# Patient Record
Sex: FEMALE | Race: OTHER | Employment: OTHER | URBAN - METROPOLITAN AREA
[De-identification: names, ages, dates, MRNs, and addresses within clinical notes are randomized per-mention and may not be internally consistent; named-entity substitution may affect disease eponyms.]

---

## 2017-06-08 ENCOUNTER — OFFICE VISIT (OUTPATIENT)
Dept: INTERNAL MEDICINE CLINIC | Facility: CLINIC | Age: 47
End: 2017-06-08

## 2017-06-08 VITALS
HEIGHT: 60 IN | WEIGHT: 123 LBS | DIASTOLIC BLOOD PRESSURE: 70 MMHG | SYSTOLIC BLOOD PRESSURE: 94 MMHG | HEART RATE: 72 BPM | BODY MASS INDEX: 24.15 KG/M2 | TEMPERATURE: 99 F

## 2017-06-08 DIAGNOSIS — Z00.00 ANNUAL PHYSICAL EXAM: Primary | ICD-10-CM

## 2017-06-08 PROCEDURE — 99396 PREV VISIT EST AGE 40-64: CPT | Performed by: INTERNAL MEDICINE

## 2017-06-08 NOTE — PROGRESS NOTES
HPI:    Patient ID: Glory Orozco is a 51year old female. HPIpatient is doing well, no complaints, physical today for . Review of Systems   Constitutional: Negative for fever, chills, activity change, appetite change and fatigue.    HENT: no distension and no mass. There is no tenderness. There is no rebound and no guarding. Musculoskeletal: She exhibits no edema or tenderness. Neurological: She is alert and oriented to person, place, and time. She exhibits normal muscle tone.  Coordinat

## 2017-11-14 ENCOUNTER — TELEPHONE (OUTPATIENT)
Dept: INTERNAL MEDICINE CLINIC | Facility: CLINIC | Age: 47
End: 2017-11-14

## 2017-11-14 DIAGNOSIS — Z12.39 SCREENING BREAST EXAMINATION: Primary | ICD-10-CM

## 2017-12-02 ENCOUNTER — HOSPITAL ENCOUNTER (OUTPATIENT)
Dept: MAMMOGRAPHY | Age: 47
Discharge: HOME | End: 2017-12-02
Attending: INTERNAL MEDICINE
Payer: COMMERCIAL

## 2017-12-02 DIAGNOSIS — Z12.39 SCREENING BREAST EXAMINATION: ICD-10-CM

## 2017-12-02 PROCEDURE — 77067 SCR MAMMO BI INCL CAD: CPT | Performed by: INTERNAL MEDICINE

## 2018-08-23 ENCOUNTER — OFFICE VISIT (OUTPATIENT)
Dept: INTERNAL MEDICINE CLINIC | Facility: CLINIC | Age: 48
End: 2018-08-23
Payer: COMMERCIAL

## 2018-08-23 VITALS
WEIGHT: 126 LBS | BODY MASS INDEX: 24.74 KG/M2 | SYSTOLIC BLOOD PRESSURE: 100 MMHG | TEMPERATURE: 99 F | HEIGHT: 60 IN | DIASTOLIC BLOOD PRESSURE: 70 MMHG | HEART RATE: 64 BPM

## 2018-08-23 DIAGNOSIS — R76.11 POSITIVE TB TEST: ICD-10-CM

## 2018-08-23 DIAGNOSIS — Z12.39 BREAST SCREENING: ICD-10-CM

## 2018-08-23 DIAGNOSIS — Z00.00 ANNUAL PHYSICAL EXAM: Primary | ICD-10-CM

## 2018-08-23 PROCEDURE — 99396 PREV VISIT EST AGE 40-64: CPT | Performed by: INTERNAL MEDICINE

## 2018-08-23 NOTE — PROGRESS NOTES
HPI:    Patient ID: Sabrina Gonzalez is a 49year old female. HPI    Review of Systems   Constitutional: Negative for activity change, appetite change, chills, fatigue and fever.    HENT: Negative for ear pain, hearing loss, nosebleeds, postnasal drip, rhi She exhibits no distension and no mass. There is no tenderness. There is no rebound and no guarding. Musculoskeletal: She exhibits no edema or tenderness. Neurological: She is alert and oriented to person, place, and time.  She exhibits normal muscle to

## 2018-08-24 LAB
ABSOLUTE BASOPHILS: 52 CELLS/UL (ref 0–200)
ABSOLUTE EOSINOPHILS: 133 CELLS/UL (ref 15–500)
ABSOLUTE LYMPHOCYTES: 2059 CELLS/UL (ref 850–3900)
ABSOLUTE MONOCYTES: 464 CELLS/UL (ref 200–950)
ABSOLUTE NEUTROPHILS: 3091 CELLS/UL (ref 1500–7800)
ALBUMIN/GLOBULIN RATIO: 1.6 (CALC) (ref 1–2.5)
ALBUMIN: 4.6 G/DL (ref 3.6–5.1)
ALKALINE PHOSPHATASE: 52 U/L (ref 33–115)
ALT: 13 U/L (ref 6–29)
APPEARANCE: CLEAR
AST: 20 U/L (ref 10–35)
BASOPHILS: 0.9 %
BILIRUBIN, TOTAL: 0.3 MG/DL (ref 0.2–1.2)
BILIRUBIN: NEGATIVE
BUN: 23 MG/DL (ref 7–25)
CALCIUM: 9.7 MG/DL (ref 8.6–10.2)
CARBON DIOXIDE: 25 MMOL/L (ref 20–32)
CHLORIDE: 104 MMOL/L (ref 98–110)
CHOL/HDLC RATIO: 2 (CALC)
CHOLESTEROL, TOTAL: 159 MG/DL
COLOR: YELLOW
CREATININE: 0.65 MG/DL (ref 0.5–1.1)
EGFR IF AFRICN AM: 122 ML/MIN/1.73M2
EGFR IF NONAFRICN AM: 105 ML/MIN/1.73M2
EOSINOPHILS: 2.3 %
GLOBULIN: 2.8 G/DL (CALC) (ref 1.9–3.7)
GLUCOSE: 87 MG/DL (ref 65–99)
GLUCOSE: NEGATIVE
HDL CHOLESTEROL: 78 MG/DL
HEMATOCRIT: 34.9 % (ref 35–45)
HEMOGLOBIN A1C: 5 % OF TOTAL HGB
HEMOGLOBIN: 11.7 G/DL (ref 11.7–15.5)
KETONES: NEGATIVE
LDL-CHOLESTEROL: 69 MG/DL (CALC)
LEUKOCYTE ESTERASE: NEGATIVE
LYMPHOCYTES: 35.5 %
MCH: 29.5 PG (ref 27–33)
MCHC: 33.5 G/DL (ref 32–36)
MCV: 88.1 FL (ref 80–100)
MONOCYTES: 8 %
MPV: 11.4 FL (ref 7.5–12.5)
NEUTROPHILS: 53.3 %
NITRITE: NEGATIVE
NON-HDL CHOLESTEROL: 81 MG/DL (CALC)
PH: 6 (ref 5–8)
PLATELET COUNT: 238 THOUSAND/UL (ref 140–400)
POTASSIUM: 4.2 MMOL/L (ref 3.5–5.3)
PROTEIN, TOTAL: 7.4 G/DL (ref 6.1–8.1)
PROTEIN: NEGATIVE
RDW: 15.2 % (ref 11–15)
RED BLOOD CELL COUNT: 3.96 MILLION/UL (ref 3.8–5.1)
SIGNAL TO CUT-OFF: 0.02
SODIUM: 137 MMOL/L (ref 135–146)
SPECIFIC GRAVITY: 1.01 (ref 1–1.03)
TRIGLYCERIDES: 50 MG/DL
TSH: 1.82 MIU/L
VITAMIN D, 25-OH, TOTAL: 28 NG/ML (ref 30–100)
WHITE BLOOD CELL COUNT: 5.8 THOUSAND/UL (ref 3.8–10.8)

## 2019-02-04 ENCOUNTER — HOSPITAL ENCOUNTER (OUTPATIENT)
Dept: MAMMOGRAPHY | Facility: HOSPITAL | Age: 49
Discharge: HOME | End: 2019-02-04
Attending: INTERNAL MEDICINE
Payer: COMMERCIAL

## 2019-02-04 DIAGNOSIS — Z12.39 BREAST SCREENING: ICD-10-CM

## 2019-02-04 PROCEDURE — 77067 SCR MAMMO BI INCL CAD: CPT | Performed by: INTERNAL MEDICINE

## 2019-02-04 PROCEDURE — 77063 BREAST TOMOSYNTHESIS BI: CPT | Performed by: INTERNAL MEDICINE

## 2020-06-25 ENCOUNTER — NURSE ONLY (OUTPATIENT)
Dept: FAMILY MEDICINE CLINIC | Facility: CLINIC | Age: 50
End: 2020-06-25
Payer: COMMERCIAL

## 2020-06-25 ENCOUNTER — OFFICE VISIT (OUTPATIENT)
Dept: INTERNAL MEDICINE CLINIC | Facility: CLINIC | Age: 50
End: 2020-06-25
Payer: COMMERCIAL

## 2020-06-25 VITALS
WEIGHT: 127 LBS | HEIGHT: 60 IN | TEMPERATURE: 99 F | DIASTOLIC BLOOD PRESSURE: 66 MMHG | SYSTOLIC BLOOD PRESSURE: 120 MMHG | BODY MASS INDEX: 24.94 KG/M2 | HEART RATE: 68 BPM

## 2020-06-25 DIAGNOSIS — R76.11 POSITIVE TB TEST: ICD-10-CM

## 2020-06-25 DIAGNOSIS — Z00.00 ANNUAL PHYSICAL EXAM: Primary | ICD-10-CM

## 2020-06-25 DIAGNOSIS — Z12.39 BREAST SCREENING: ICD-10-CM

## 2020-06-25 DIAGNOSIS — Z12.11 COLON CANCER SCREENING: ICD-10-CM

## 2020-06-25 PROCEDURE — 99396 PREV VISIT EST AGE 40-64: CPT | Performed by: INTERNAL MEDICINE

## 2020-06-25 RX ORDER — ASCORBIC ACID 500 MG
500 TABLET ORAL DAILY
COMMUNITY

## 2020-06-25 RX ORDER — ACETAMINOPHEN 160 MG
2000 TABLET,DISINTEGRATING ORAL 3 TIMES DAILY
COMMUNITY

## 2020-06-25 RX ORDER — HYDROCORTISONE ACETATE 0.5 %
CREAM (GRAM) TOPICAL
COMMUNITY

## 2020-06-25 RX ORDER — ELECTROLYTES/DEXTROSE
SOLUTION, ORAL ORAL
COMMUNITY
End: 2022-02-01

## 2020-06-25 RX ORDER — MELATONIN
1000 DAILY
COMMUNITY

## 2020-06-26 NOTE — PROGRESS NOTES
HPI:    Patient ID: Lane Arauz is a 47year old female. HPIpatient is in need of her annual for work, needs TB screening, history of previous positive skin test.   No chest pain , no fever no chills, no cough .    Has right ankle tightness at times, Apply 1 Units topically twice a week.  120 mL 11     Allergies:  Seasonal                   PHYSICAL EXAM:   /66   Pulse 68   Temp 98.8 °F (37.1 °C)   Ht 5' (1.524 m)   Wt 127 lb (57.6 kg)   BMI 24.80 kg/m²      Physical Exam   Constitutional: She is

## 2020-07-02 ENCOUNTER — TELEPHONE (OUTPATIENT)
Dept: FAMILY MEDICINE CLINIC | Facility: CLINIC | Age: 50
End: 2020-07-02

## 2020-07-03 NOTE — TELEPHONE ENCOUNTER
Patient notified the form is completed and copies of chest xray received and attached. She is picking up today.

## 2021-04-17 ENCOUNTER — HOSPITAL ENCOUNTER (OUTPATIENT)
Dept: MAMMOGRAPHY | Age: 51
Discharge: HOME | End: 2021-04-17
Attending: INTERNAL MEDICINE
Payer: COMMERCIAL

## 2021-04-17 DIAGNOSIS — Z12.39 BREAST SCREENING: ICD-10-CM

## 2021-04-17 PROCEDURE — 77063 BREAST TOMOSYNTHESIS BI: CPT | Performed by: INTERNAL MEDICINE

## 2021-04-17 PROCEDURE — 77067 SCR MAMMO BI INCL CAD: CPT | Performed by: INTERNAL MEDICINE

## 2021-04-19 ENCOUNTER — TELEPHONE (OUTPATIENT)
Dept: INTERNAL MEDICINE CLINIC | Facility: CLINIC | Age: 51
End: 2021-04-19

## 2021-04-19 DIAGNOSIS — Z01.419 ENCOUNTER FOR ANNUAL ROUTINE GYNECOLOGICAL EXAMINATION: Primary | ICD-10-CM

## 2021-05-22 ENCOUNTER — OFFICE VISIT (OUTPATIENT)
Dept: OBGYN CLINIC | Facility: CLINIC | Age: 51
End: 2021-05-22
Payer: COMMERCIAL

## 2021-05-22 VITALS
BODY MASS INDEX: 25.72 KG/M2 | WEIGHT: 131 LBS | SYSTOLIC BLOOD PRESSURE: 108 MMHG | HEIGHT: 60 IN | DIASTOLIC BLOOD PRESSURE: 64 MMHG

## 2021-05-22 DIAGNOSIS — Z01.419 ENCOUNTER FOR WELL WOMAN EXAM: Primary | ICD-10-CM

## 2021-05-22 DIAGNOSIS — Z01.419 ENCOUNTER FOR GYNECOLOGICAL EXAMINATION WITHOUT ABNORMAL FINDING: ICD-10-CM

## 2021-05-22 PROCEDURE — 3074F SYST BP LT 130 MM HG: CPT | Performed by: OBSTETRICS & GYNECOLOGY

## 2021-05-22 PROCEDURE — 3008F BODY MASS INDEX DOCD: CPT | Performed by: OBSTETRICS & GYNECOLOGY

## 2021-05-22 PROCEDURE — 3078F DIAST BP <80 MM HG: CPT | Performed by: OBSTETRICS & GYNECOLOGY

## 2021-05-22 PROCEDURE — 99386 PREV VISIT NEW AGE 40-64: CPT | Performed by: OBSTETRICS & GYNECOLOGY

## 2021-05-22 NOTE — PROGRESS NOTES
HPI/Subjective:   Patient ID: Helga Hendrix is a 45year old female. Patient here for routine exam.  New to office. No C/Os. Needs pap smear today. Recently had normal mammogram that was ordered by PCP. Will do breast exam today.   Patient reports h adenopathy. Abdominal:      General: Bowel sounds are normal.      Palpations: Abdomen is soft. There is no mass. Tenderness: There is no abdominal tenderness. Genitourinary:     General: Normal vulva. Vagina: Normal. No vaginal discharge.

## 2021-05-27 ENCOUNTER — OFFICE VISIT (OUTPATIENT)
Dept: GASTROENTEROLOGY | Facility: CLINIC | Age: 51
End: 2021-05-27
Payer: COMMERCIAL

## 2021-05-27 ENCOUNTER — TELEPHONE (OUTPATIENT)
Dept: GASTROENTEROLOGY | Facility: CLINIC | Age: 51
End: 2021-05-27

## 2021-05-27 VITALS
HEART RATE: 72 BPM | DIASTOLIC BLOOD PRESSURE: 74 MMHG | WEIGHT: 131.38 LBS | BODY MASS INDEX: 25.79 KG/M2 | HEIGHT: 60 IN | SYSTOLIC BLOOD PRESSURE: 124 MMHG

## 2021-05-27 DIAGNOSIS — Z80.0 FAMILY HISTORY OF COLON CANCER: ICD-10-CM

## 2021-05-27 DIAGNOSIS — Z12.11 COLON CANCER SCREENING: Primary | ICD-10-CM

## 2021-05-27 PROCEDURE — 3008F BODY MASS INDEX DOCD: CPT | Performed by: INTERNAL MEDICINE

## 2021-05-27 PROCEDURE — 3074F SYST BP LT 130 MM HG: CPT | Performed by: INTERNAL MEDICINE

## 2021-05-27 PROCEDURE — 3078F DIAST BP <80 MM HG: CPT | Performed by: INTERNAL MEDICINE

## 2021-05-27 PROCEDURE — S0285 CNSLT BEFORE SCREEN COLONOSC: HCPCS | Performed by: INTERNAL MEDICINE

## 2021-05-27 NOTE — PROGRESS NOTES
Jeane Ferreira is a 45year old female. HPI:   Patient presents with:  Colonoscopy Screening    The patient is a 45year old female who has a history of tuberculosis, fractured wrist who presents for colon cancer screening.   She does have a family histo pulse 72, height 5' (1.524 m), weight 131 lb 6.4 oz (59.6 kg), last menstrual period 04/27/2021.     The patient appears their stated age and is in no acute distress  HEENT- anicteric sclera, neck no lymphadnopathy, OP- clear with no masses or lesions  Ches

## 2021-05-28 NOTE — TELEPHONE ENCOUNTER
Scheduled for:  Colonoscopy 84606  Provider Name:    Date:  8/6/21  Location:  [facility]  Sedation: Mac   Time:  10:30 Am , (pt is aware to arrive at 0930 Am )  Prep:  Miralax prep + Gatorade + Dulcolax tablets, Prep instructions were given to pt in the

## 2021-08-06 ENCOUNTER — HOSPITAL ENCOUNTER (OUTPATIENT)
Age: 51
Setting detail: HOSPITAL OUTPATIENT SURGERY
Discharge: HOME | End: 2021-08-06
Attending: INTERNAL MEDICINE | Admitting: INTERNAL MEDICINE
Payer: COMMERCIAL

## 2021-08-06 ENCOUNTER — TELEPHONE (OUTPATIENT)
Dept: GASTROENTEROLOGY | Facility: CLINIC | Age: 51
End: 2021-08-06

## 2021-08-06 ENCOUNTER — ANESTHESIA EVENT (OUTPATIENT)
Dept: ENDOSCOPY | Age: 51
End: 2021-08-06
Payer: COMMERCIAL

## 2021-08-06 ENCOUNTER — ANESTHESIA (OUTPATIENT)
Dept: ENDOSCOPY | Age: 51
End: 2021-08-06
Payer: COMMERCIAL

## 2021-08-06 VITALS
WEIGHT: 125 LBS | SYSTOLIC BLOOD PRESSURE: 108 MMHG | TEMPERATURE: 97 F | DIASTOLIC BLOOD PRESSURE: 61 MMHG | HEART RATE: 57 BPM | OXYGEN SATURATION: 100 % | RESPIRATION RATE: 12 BRPM | HEIGHT: 60 IN | BODY MASS INDEX: 24.54 KG/M2

## 2021-08-06 DIAGNOSIS — Z12.11 COLON CANCER SCREENING: ICD-10-CM

## 2021-08-06 DIAGNOSIS — Z80.0 FAMILY HISTORY OF COLON CANCER: ICD-10-CM

## 2021-08-06 PROCEDURE — 45378 DIAGNOSTIC COLONOSCOPY: CPT | Performed by: INTERNAL MEDICINE

## 2021-08-06 RX ORDER — SODIUM CHLORIDE, SODIUM LACTATE, POTASSIUM CHLORIDE, CALCIUM CHLORIDE 600; 310; 30; 20 MG/100ML; MG/100ML; MG/100ML; MG/100ML
INJECTION, SOLUTION INTRAVENOUS CONTINUOUS
Status: CANCELLED | OUTPATIENT
Start: 2021-08-06

## 2021-08-06 RX ORDER — LIDOCAINE HYDROCHLORIDE 10 MG/ML
INJECTION, SOLUTION EPIDURAL; INFILTRATION; INTRACAUDAL; PERINEURAL AS NEEDED
Status: DISCONTINUED | OUTPATIENT
Start: 2021-08-06 | End: 2021-08-06 | Stop reason: SURG

## 2021-08-06 RX ORDER — NALOXONE HYDROCHLORIDE 0.4 MG/ML
80 INJECTION, SOLUTION INTRAMUSCULAR; INTRAVENOUS; SUBCUTANEOUS AS NEEDED
Status: CANCELLED | OUTPATIENT
Start: 2021-08-06 | End: 2021-08-06

## 2021-08-06 RX ORDER — SODIUM CHLORIDE, SODIUM LACTATE, POTASSIUM CHLORIDE, CALCIUM CHLORIDE 600; 310; 30; 20 MG/100ML; MG/100ML; MG/100ML; MG/100ML
INJECTION, SOLUTION INTRAVENOUS CONTINUOUS
Status: DISCONTINUED | OUTPATIENT
Start: 2021-08-06 | End: 2021-08-06

## 2021-08-06 RX ADMIN — SODIUM CHLORIDE, SODIUM LACTATE, POTASSIUM CHLORIDE, CALCIUM CHLORIDE: 600; 310; 30; 20 INJECTION, SOLUTION INTRAVENOUS at 10:59:00

## 2021-08-06 RX ADMIN — SODIUM CHLORIDE, SODIUM LACTATE, POTASSIUM CHLORIDE, CALCIUM CHLORIDE: 600; 310; 30; 20 INJECTION, SOLUTION INTRAVENOUS at 10:42:00

## 2021-08-06 RX ADMIN — LIDOCAINE HYDROCHLORIDE 25 MG: 10 INJECTION, SOLUTION EPIDURAL; INFILTRATION; INTRACAUDAL; PERINEURAL at 10:44:00

## 2021-08-06 NOTE — H&P
History & Physical Examination    Patient Name: Nehemiah Butler  MRN: [id number]  [facility]: [id number]  YOB: 1970    Diagnosis:     Colon cancer screening  Family history of colon cancer      Vitamin D3 48 MCG (2000 UT) Oral Cap, Take 2,000 Units b care.  [ x ] I have reviewed the History and Physical done within the last 30 days. Any changes noted above. Harris Barrientos.  Chen Ferris MD  8/6/2021  10:38 AM

## 2021-08-06 NOTE — OPERATIVE REPORT
[facility] HOSP - [facility] Endoscopy Report      Preoperative Diagnosis:  - colon cancer screening  - family history of colon cancer (father)      Postoperative Diagnosis:  - small internal hemorrhoids      Procedure:    Colonoscopy      Surgeon:  Sidney Norris

## 2021-08-06 NOTE — ANESTHESIA PREPROCEDURE EVALUATION
Anesthesia PreOp Note    HPI:     Nichole Berumen is a 45year old female who presents for preoperative consultation requested by: Colton Suárez MD    Date of Surgery: 8/6/2021    Procedure(s):  COLONOSCOPY  Indication: Colon cancer screening, Family hi , Rfl: , 8/2/2021  Ketoconazole 2 % Apply Externally Shampoo, Apply 1 Units topically twice a week., Disp: 120 mL, Rfl: 11, 8/6/2021      lactated ringers infusion, , Intravenous, Continuous, Shorty Rose MD    No current [facility]-ordered outpatient medic Vital Signs: Body mass index is 24.41 kg/m². height is 1.524 m (5') and weight is 56.7 kg (125 lb). Her tympanic temperature is 96.8 °F (36 °C). Her blood pressure is 102/55 and her pulse is 59. Her respiration is 16 and oxygen saturation is 98%.

## 2021-08-06 NOTE — TELEPHONE ENCOUNTER
Health Maintenance Updated. 5 year colonoscopy recall entered into patient outreach in [address] [place] [address] Rd. Next colonoscopy will be due 8/6/2026.

## 2021-10-21 RX ORDER — KETOCONAZOLE 20 MG/ML
1 SHAMPOO TOPICAL
Qty: 120 ML | Refills: 0 | Status: SHIPPED | OUTPATIENT
Start: 2021-10-21 | End: 2021-11-24

## 2021-10-24 ENCOUNTER — PATIENT MESSAGE (OUTPATIENT)
Dept: INTERNAL MEDICINE CLINIC | Facility: CLINIC | Age: 51
End: 2021-10-24

## 2021-10-25 ENCOUNTER — NURSE TRIAGE (OUTPATIENT)
Dept: INTERNAL MEDICINE CLINIC | Facility: CLINIC | Age: 51
End: 2021-10-25

## 2021-10-25 RX ORDER — MELOXICAM 15 MG/1
15 TABLET ORAL DAILY
Qty: 30 TABLET | Refills: 1 | Status: SHIPPED | OUTPATIENT
Start: 2021-10-25 | End: 2022-01-20

## 2021-10-25 NOTE — TELEPHONE ENCOUNTER
Action Requested: Summary for Provider     []  Critical Lab, Recommendations Needed  [x] Need Additional Advice  []   FYI    []   Need Orders  [x] Need Medications Sent to Pharmacy  []  Other     SUMMARY:   Verified name and .   Patient states that she h

## 2021-10-25 NOTE — TELEPHONE ENCOUNTER
Verified name and . Patient was advised of Dr. Keyla Dinh message. Patient verbalizes understanding and agrees with plan.       Boyd Bella MD  to [facility] Rn Triage        10/25/21 12:54 PM  Rx fr sent for patient , she should try 1/2 tablet daily with fo

## 2021-10-25 NOTE — TELEPHONE ENCOUNTER
From: Luis Dominguez  To: Yasemin Huitron MD  Sent: 10/24/2021 2:38 PM CDT  Subject: Diane Vera, I am sending this to ask if you can prescribe an anti inflammatory for me, I have taken Meloxicam from [facility] prescription and it help

## 2021-11-23 ENCOUNTER — NURSE TRIAGE (OUTPATIENT)
Dept: INTERNAL MEDICINE CLINIC | Facility: CLINIC | Age: 51
End: 2021-11-23

## 2021-11-23 NOTE — TELEPHONE ENCOUNTER
Action Requested: Summary for Provider     []  Critical Lab, Recommendations Needed  [] Need Additional Advice  []   FYI    []   Need Orders  [] Need Medications Sent to Pharmacy  []  Other     SUMMARY:Pt requesting to be seen only with Dr Sepulveda [facility]

## 2021-11-24 RX ORDER — KETOCONAZOLE 20 MG/ML
3 SHAMPOO TOPICAL
Qty: 120 ML | Refills: 3 | Status: SHIPPED | OUTPATIENT
Start: 2021-11-25 | End: 2023-08-25

## 2021-11-24 NOTE — TELEPHONE ENCOUNTER
Please review refill protocol failed/ no protocol  Requested Prescriptions   Pending Prescriptions Disp Refills    KETOCONAZOLE 2 % External Shampoo [Pharmacy Med Name: KETOCONAZOLE 2% SHAMPOO 120ML] 120 mL 0     Sig: APPLY 1 ML TOPICALLY 2 TIMES A WEEK        There is no refill protocol information for this order

## 2021-11-26 NOTE — TELEPHONE ENCOUNTER
Pt has an appointment with Dr. Ervin Ruano scheduled for 1/6/22 but that is for an annual physical. Pt has not viewed Maryellen's Attunity message yet.

## 2021-11-26 NOTE — TELEPHONE ENCOUNTER
Spoke to patient. She scheduled an appointment for her arm for 12/27.      Future Appointments   Date Time Provider Talha Victoriai   12/27/2021  5:30 PM Julio Arora MD [address] [place] [address], [facility]   1/6/2022  6:00 PM Julio Arora MD [address] [place] [address], [facility]

## 2021-12-27 ENCOUNTER — OFFICE VISIT (OUTPATIENT)
Dept: INTERNAL MEDICINE CLINIC | Facility: CLINIC | Age: 51
End: 2021-12-27
Payer: COMMERCIAL

## 2021-12-27 VITALS
BODY MASS INDEX: 26.9 KG/M2 | WEIGHT: 137 LBS | HEIGHT: 60 IN | DIASTOLIC BLOOD PRESSURE: 60 MMHG | TEMPERATURE: 98 F | HEART RATE: 60 BPM | SYSTOLIC BLOOD PRESSURE: 90 MMHG

## 2021-12-27 DIAGNOSIS — M54.12 CERVICAL RADICULOPATHY: Primary | ICD-10-CM

## 2021-12-27 PROCEDURE — 3008F BODY MASS INDEX DOCD: CPT | Performed by: INTERNAL MEDICINE

## 2021-12-27 PROCEDURE — 99214 OFFICE O/P EST MOD 30 MIN: CPT | Performed by: INTERNAL MEDICINE

## 2021-12-27 PROCEDURE — 3074F SYST BP LT 130 MM HG: CPT | Performed by: INTERNAL MEDICINE

## 2021-12-27 PROCEDURE — 3078F DIAST BP <80 MM HG: CPT | Performed by: INTERNAL MEDICINE

## 2021-12-27 RX ORDER — METHYLPREDNISOLONE 4 MG/1
TABLET ORAL
Qty: 1 EACH | Refills: 0 | Status: SHIPPED | OUTPATIENT
Start: 2021-12-27

## 2021-12-28 NOTE — ASSESSMENT & PLAN NOTE
Will obtain xray of the neck, see neurology . ,Medrol dosepack. Patient has left ulnar nerve symptoms, and left neck pain , she also has right low back pain and right leg pain .

## 2021-12-28 NOTE — PROGRESS NOTES
HPI:    Patient ID: Helga Hendrix is a 45year old female. HPIpatient has had left arm symptoms for 7 months. Many years ago she had surgery on the left arm due to nerve entrapment, Dr Margaret Ruiz, ortho at [facility]. Did the surgery .    Now she sta Take 1 tablet (15 mg total) by mouth daily. 30 tablet 1   • Vitamin D3 50 MCG (2000 UT) Oral Cap Take 2,000 Units by mouth 3 (three) times daily. • Vitamin C 500 MG Oral Tab Take 500 mg by mouth daily.      • Vitamin B-12 1000 MCG Oral Tab Take 1,000 mc time.      Motor: No abnormal muscle tone. Coordination: Coordination normal.   Psychiatric:         Behavior: Behavior normal.         Thought Content:  Thought content normal.         Judgment: Judgment normal.                ASSESSMENT/PLAN:   Yady Johnson

## 2022-01-20 RX ORDER — KETOCONAZOLE 20 MG/ML
3 SHAMPOO TOPICAL
Qty: 120 ML | Refills: 3 | OUTPATIENT
Start: 2022-01-20

## 2022-01-20 RX ORDER — MELOXICAM 15 MG/1
15 TABLET ORAL DAILY
Qty: 30 TABLET | Refills: 1 | Status: SHIPPED | OUTPATIENT
Start: 2022-01-20

## 2022-01-20 NOTE — TELEPHONE ENCOUNTER
Please review; protocol failed. Or has no protocol    Requested Prescriptions   Pending Prescriptions Disp Refills    Meloxicam 15 MG Oral Tab 30 tablet 1     Sig: Take 1 tablet (15 mg total) by mouth daily.         Non-Narcotic Pain Medication Protocol Pas

## 2022-10-11 ENCOUNTER — ORDER TRANSCRIPTION (OUTPATIENT)
Dept: ADMINISTRATIVE | Facility: HOSPITAL | Age: 52
End: 2022-10-11

## 2022-10-11 DIAGNOSIS — Z12.31 ENCOUNTER FOR SCREENING MAMMOGRAM FOR MALIGNANT NEOPLASM OF BREAST: Primary | ICD-10-CM

## 2022-11-30 ENCOUNTER — OFFICE VISIT (OUTPATIENT)
Dept: OBGYN CLINIC | Facility: CLINIC | Age: 52
End: 2022-11-30
Payer: COMMERCIAL

## 2022-11-30 VITALS — BODY MASS INDEX: 27 KG/M2 | DIASTOLIC BLOOD PRESSURE: 72 MMHG | SYSTOLIC BLOOD PRESSURE: 117 MMHG | WEIGHT: 138 LBS

## 2022-11-30 DIAGNOSIS — Z01.419 ENCOUNTER FOR GYNECOLOGICAL EXAMINATION WITHOUT ABNORMAL FINDING: Primary | ICD-10-CM

## 2022-11-30 PROCEDURE — 3078F DIAST BP <80 MM HG: CPT | Performed by: OBSTETRICS & GYNECOLOGY

## 2022-11-30 PROCEDURE — 3074F SYST BP LT 130 MM HG: CPT | Performed by: OBSTETRICS & GYNECOLOGY

## 2022-11-30 PROCEDURE — 99396 PREV VISIT EST AGE 40-64: CPT | Performed by: OBSTETRICS & GYNECOLOGY

## 2022-12-14 ENCOUNTER — HOSPITAL ENCOUNTER (OUTPATIENT)
Dept: MAMMOGRAPHY | Age: 52
Discharge: HOME | End: 2022-12-14
Attending: INTERNAL MEDICINE
Payer: COMMERCIAL

## 2022-12-14 DIAGNOSIS — Z12.31 ENCOUNTER FOR SCREENING MAMMOGRAM FOR MALIGNANT NEOPLASM OF BREAST: ICD-10-CM

## 2022-12-14 PROCEDURE — 77067 SCR MAMMO BI INCL CAD: CPT | Performed by: INTERNAL MEDICINE

## 2022-12-14 PROCEDURE — 77063 BREAST TOMOSYNTHESIS BI: CPT | Performed by: INTERNAL MEDICINE

## 2022-12-15 ENCOUNTER — OFFICE VISIT (OUTPATIENT)
Dept: INTERNAL MEDICINE CLINIC | Facility: CLINIC | Age: 52
End: 2022-12-15
Payer: COMMERCIAL

## 2022-12-15 ENCOUNTER — MED REC SCAN ONLY (OUTPATIENT)
Dept: INTERNAL MEDICINE CLINIC | Facility: CLINIC | Age: 52
End: 2022-12-15

## 2022-12-15 VITALS
DIASTOLIC BLOOD PRESSURE: 74 MMHG | BODY MASS INDEX: 26.9 KG/M2 | WEIGHT: 137 LBS | HEIGHT: 60 IN | SYSTOLIC BLOOD PRESSURE: 118 MMHG | TEMPERATURE: 98 F | HEART RATE: 76 BPM

## 2022-12-15 DIAGNOSIS — Z00.00 ANNUAL PHYSICAL EXAM: Primary | ICD-10-CM

## 2022-12-15 DIAGNOSIS — R92.1 BREAST CALCIFICATION, RIGHT: Primary | ICD-10-CM

## 2022-12-15 PROCEDURE — 3074F SYST BP LT 130 MM HG: CPT | Performed by: INTERNAL MEDICINE

## 2022-12-15 PROCEDURE — 3078F DIAST BP <80 MM HG: CPT | Performed by: INTERNAL MEDICINE

## 2022-12-15 PROCEDURE — 99396 PREV VISIT EST AGE 40-64: CPT | Performed by: INTERNAL MEDICINE

## 2022-12-15 PROCEDURE — 3008F BODY MASS INDEX DOCD: CPT | Performed by: INTERNAL MEDICINE

## 2022-12-16 LAB
ABSOLUTE BASOPHILS: 41 CELLS/UL (ref 0–200)
ABSOLUTE EOSINOPHILS: 110 CELLS/UL (ref 15–500)
ABSOLUTE LYMPHOCYTES: 1995 CELLS/UL (ref 850–3900)
ABSOLUTE MONOCYTES: 423 CELLS/UL (ref 200–950)
ABSOLUTE NEUTROPHILS: 3231 CELLS/UL (ref 1500–7800)
ALBUMIN/GLOBULIN RATIO: 1.7 (CALC) (ref 1–2.5)
ALBUMIN: 4.6 G/DL (ref 3.6–5.1)
ALKALINE PHOSPHATASE: 70 U/L (ref 37–153)
ALT: 21 U/L (ref 6–29)
APPEARANCE: CLEAR
AST: 25 U/L (ref 10–35)
BASOPHILS: 0.7 %
BILIRUBIN, TOTAL: 0.3 MG/DL (ref 0.2–1.2)
BILIRUBIN: NEGATIVE
BUN: 25 MG/DL (ref 7–25)
CALCIUM: 9.9 MG/DL (ref 8.6–10.4)
CARBON DIOXIDE: 22 MMOL/L (ref 20–32)
CHLORIDE: 103 MMOL/L (ref 98–110)
CHOL/HDLC RATIO: 2 (CALC)
CHOLESTEROL, TOTAL: 175 MG/DL
COLOR: YELLOW
CREATININE: 0.63 MG/DL (ref 0.5–1.03)
EGFR: 106 ML/MIN/1.73M2
EOSINOPHILS: 1.9 %
GLOBULIN: 2.7 G/DL (CALC) (ref 1.9–3.7)
GLUCOSE: 67 MG/DL (ref 65–99)
GLUCOSE: NEGATIVE
HDL CHOLESTEROL: 88 MG/DL
HEMATOCRIT: 38.3 % (ref 35–45)
HEMOGLOBIN A1C: 5.5 % OF TOTAL HGB
HEMOGLOBIN: 13 G/DL (ref 11.7–15.5)
KETONES: NEGATIVE
LDL-CHOLESTEROL: 74 MG/DL (CALC)
LEUKOCYTE ESTERASE: NEGATIVE
LYMPHOCYTES: 34.4 %
MCH: 31 PG (ref 27–33)
MCHC: 33.9 G/DL (ref 32–36)
MCV: 91.2 FL (ref 80–100)
MONOCYTES: 7.3 %
MPV: 11.2 FL (ref 7.5–12.5)
NEUTROPHILS: 55.7 %
NITRITE: NEGATIVE
NON-HDL CHOLESTEROL: 87 MG/DL (CALC)
OCCULT BLOOD: NEGATIVE
PH: 6.5 (ref 5–8)
PLATELET COUNT: 259 THOUSAND/UL (ref 140–400)
POTASSIUM: 5.8 MMOL/L (ref 3.5–5.3)
PROTEIN, TOTAL: 7.3 G/DL (ref 6.1–8.1)
PROTEIN: NEGATIVE
RDW: 12.4 % (ref 11–15)
RED BLOOD CELL COUNT: 4.2 MILLION/UL (ref 3.8–5.1)
SODIUM: 138 MMOL/L (ref 135–146)
SPECIFIC GRAVITY: 1.01 (ref 1–1.03)
T4 (THYROXINE), TOTAL: 7.7 MCG/DL (ref 5.1–11.9)
TRIGLYCERIDES: 54 MG/DL
TSH: 2.31 MIU/L
WHITE BLOOD CELL COUNT: 5.8 THOUSAND/UL (ref 3.8–10.8)

## 2022-12-23 ENCOUNTER — HOSPITAL ENCOUNTER (OUTPATIENT)
Dept: MAMMOGRAPHY | Facility: HOSPITAL | Age: 52
Discharge: HOME | End: 2022-12-23
Attending: INTERNAL MEDICINE
Payer: COMMERCIAL

## 2022-12-23 DIAGNOSIS — R92.1 BREAST CALCIFICATION, RIGHT: ICD-10-CM

## 2022-12-23 PROCEDURE — 77061 BREAST TOMOSYNTHESIS UNI: CPT | Performed by: INTERNAL MEDICINE

## 2022-12-23 PROCEDURE — 77065 DX MAMMO INCL CAD UNI: CPT | Performed by: INTERNAL MEDICINE

## 2022-12-26 DIAGNOSIS — R92.0 BREAST MICROCALCIFICATION, MAMMOGRAPHIC: Primary | ICD-10-CM

## 2022-12-27 ENCOUNTER — TELEPHONE (OUTPATIENT)
Dept: MAMMOGRAPHY | Facility: HOSPITAL | Age: 52
End: 2022-12-27

## 2022-12-27 NOTE — TELEPHONE ENCOUNTER
Called to schedule  Maryellen Pedro  refuisng bx at this time due to high insurance deductible \"very high deductible too scary I have to talk to my doctor first before  I can schedule I have to figure it out I will call you when I want to schedule right now I cant do bx and I have to talk to my  too\" emotional support given.

## 2023-01-13 ENCOUNTER — TELEPHONE (OUTPATIENT)
Dept: INTERNAL MEDICINE CLINIC | Facility: CLINIC | Age: 53
End: 2023-01-13

## 2023-01-13 NOTE — TELEPHONE ENCOUNTER
Patient has additional questions about scheduling her biopsy and is requesting to speak with Dr. Taiwo Arshad.

## 2023-01-16 NOTE — TELEPHONE ENCOUNTER
Message left with the rationalization behind ordering the biopsy and that it is very important that she get it done . Patient did not answer the phone.

## 2023-01-20 ENCOUNTER — TELEPHONE (OUTPATIENT)
Dept: ULTRASOUND IMAGING | Facility: HOSPITAL | Age: 53
End: 2023-01-20

## 2023-01-20 NOTE — TELEPHONE ENCOUNTER
Dr Carrasco Letters called to inform me that Mennie Boeck is going to [facility] for her biopsy . He inquired if I knew why she hasn't gotten her bx . I informed him that initially she wanted to wait until first of the year due to high deductible. And meet with him to discus. Dr Kimble Reading informed me that she is taking her films and going to [place] so we do not have to phoebe lher and schedule her.

## 2023-04-17 ENCOUNTER — MED REC SCAN ONLY (OUTPATIENT)
Dept: INTERNAL MEDICINE CLINIC | Facility: CLINIC | Age: 53
End: 2023-04-17

## 2023-08-25 ENCOUNTER — OFFICE VISIT (OUTPATIENT)
Dept: INTERNAL MEDICINE CLINIC | Facility: CLINIC | Age: 53
End: 2023-08-25

## 2023-08-25 ENCOUNTER — LAB ENCOUNTER (OUTPATIENT)
Dept: LAB | Age: 53
End: 2023-08-25
Attending: INTERNAL MEDICINE
Payer: COMMERCIAL

## 2023-08-25 VITALS
WEIGHT: 137 LBS | DIASTOLIC BLOOD PRESSURE: 71 MMHG | HEART RATE: 78 BPM | SYSTOLIC BLOOD PRESSURE: 130 MMHG | OXYGEN SATURATION: 98 % | HEIGHT: 61 IN | BODY MASS INDEX: 25.86 KG/M2 | RESPIRATION RATE: 16 BRPM

## 2023-08-25 DIAGNOSIS — Z00.00 ANNUAL PHYSICAL EXAM: Primary | ICD-10-CM

## 2023-08-25 PROCEDURE — 3008F BODY MASS INDEX DOCD: CPT | Performed by: INTERNAL MEDICINE

## 2023-08-25 PROCEDURE — 3075F SYST BP GE 130 - 139MM HG: CPT | Performed by: INTERNAL MEDICINE

## 2023-08-25 PROCEDURE — 3078F DIAST BP <80 MM HG: CPT | Performed by: INTERNAL MEDICINE

## 2023-08-25 PROCEDURE — 99396 PREV VISIT EST AGE 40-64: CPT | Performed by: INTERNAL MEDICINE

## 2023-08-25 RX ORDER — KETOCONAZOLE 20 MG/ML
3 SHAMPOO TOPICAL
Qty: 120 ML | Refills: 3 | Status: SHIPPED | OUTPATIENT
Start: 2023-08-28

## 2023-08-26 LAB
ABSOLUTE BASOPHILS: 31 CELLS/UL (ref 0–200)
ABSOLUTE EOSINOPHILS: 39 CELLS/UL (ref 15–500)
ABSOLUTE LYMPHOCYTES: 1763 CELLS/UL (ref 850–3900)
ABSOLUTE MONOCYTES: 312 CELLS/UL (ref 200–950)
ABSOLUTE NEUTROPHILS: 1755 CELLS/UL (ref 1500–7800)
ALBUMIN/GLOBULIN RATIO: 1.5 (CALC) (ref 1–2.5)
ALBUMIN: 4.6 G/DL (ref 3.6–5.1)
ALKALINE PHOSPHATASE: 70 U/L (ref 37–153)
ALT: 17 U/L (ref 6–29)
APPEARANCE: CLEAR
AST: 26 U/L (ref 10–35)
BASOPHILS: 0.8 %
BILIRUBIN, TOTAL: 0.6 MG/DL (ref 0.2–1.2)
BILIRUBIN: NEGATIVE
BUN: 17 MG/DL (ref 7–25)
CALCIUM: 9.9 MG/DL (ref 8.6–10.4)
CARBON DIOXIDE: 24 MMOL/L (ref 20–32)
CHLORIDE: 105 MMOL/L (ref 98–110)
CHOL/HDLC RATIO: 2 (CALC)
CHOLESTEROL, TOTAL: 179 MG/DL
COLOR: YELLOW
CREATININE: 0.63 MG/DL (ref 0.5–1.03)
EGFR: 106 ML/MIN/1.73M2
EOSINOPHILS: 1 %
GLOBULIN: 3 G/DL (CALC) (ref 1.9–3.7)
GLUCOSE: 92 MG/DL (ref 65–99)
GLUCOSE: NEGATIVE
HDL CHOLESTEROL: 88 MG/DL
HEMATOCRIT: 39.4 % (ref 35–45)
HEMOGLOBIN A1C: 5.5 % OF TOTAL HGB
HEMOGLOBIN: 13.3 G/DL (ref 11.7–15.5)
LDL-CHOLESTEROL: 82 MG/DL (CALC)
LYMPHOCYTES: 45.2 %
MCH: 31 PG (ref 27–33)
MCHC: 33.8 G/DL (ref 32–36)
MCV: 91.8 FL (ref 80–100)
MONOCYTES: 8 %
MPV: 11.5 FL (ref 7.5–12.5)
NEUTROPHILS: 45 %
NITRITE: NEGATIVE
NON-HDL CHOLESTEROL: 91 MG/DL (CALC)
PH: 5.5 (ref 5–8)
PLATELET COUNT: 228 THOUSAND/UL (ref 140–400)
POTASSIUM: 4.2 MMOL/L (ref 3.5–5.3)
PROTEIN, TOTAL: 7.6 G/DL (ref 6.1–8.1)
PROTEIN: NEGATIVE
RDW: 12 % (ref 11–15)
RED BLOOD CELL COUNT: 4.29 MILLION/UL (ref 3.8–5.1)
SODIUM: 140 MMOL/L (ref 135–146)
SPECIFIC GRAVITY: 1.02 (ref 1–1.03)
T4 (THYROXINE), TOTAL: 7.7 MCG/DL (ref 5.1–11.9)
TRIGLYCERIDES: 32 MG/DL
TSH: 1.19 MIU/L
WHITE BLOOD CELL COUNT: 3.9 THOUSAND/UL (ref 3.8–10.8)

## 2023-10-12 ENCOUNTER — TELEPHONE (OUTPATIENT)
Dept: INTERNAL MEDICINE CLINIC | Facility: CLINIC | Age: 53
End: 2023-10-12

## 2023-10-12 NOTE — TELEPHONE ENCOUNTER
Patient stated spoke with Khris Owens  in billing department and was told to reach out to Dr. Leigh Solis office    Was advised that Dr. Leigh Solis need to state why additional views for mammogram    Provider her with billing office appointment

## 2023-10-15 NOTE — TELEPHONE ENCOUNTER
It is very clear that on the screening mammogram Radiology recommended a diagnostic mammogram due to microcalcifications , What further explanation is needed?

## 2024-01-01 NOTE — ANESTHESIA POSTPROCEDURE EVALUATION
Patient: Sean Arvizu    Procedure Summary     Date: 08/06/21 Room / Location: [facility] 01 / [facility] ENDO    Anesthesia Start: 6170 Anesthesia Stop: 0417    Procedure: COLONOSCOPY (N/A ) Diagnosis:       Colon cancer screening      Family history of
2024

## 2024-06-23 NOTE — TELEPHONE ENCOUNTER
Called patient, she states she has additional questions about her mammogram results/biopsy recommendation. Per patient it is not urgent, she is aware provider out of office today and can wait for Monday. Patient states she would just like to discuss the procedure with Dr. Ursula Nur before she proceeds. Patient can be reached at [phone]. TF resumed.

## (undated) DEVICE — LINE MNTR ADLT SET O2 INTMD

## (undated) DEVICE — Device: Brand: CUSTOM PROCEDURE KIT

## (undated) DEVICE — Device: Brand: DEFENDO AIR/WATER/SUCTION AND BIOPSY VALVE

## (undated) DEVICE — MEDI-VAC NON-CONDUCTIVE SUCTION TUBING 6MM X 1.8M (6FT.) L: Brand: CARDINAL HEALTH

## (undated) NOTE — MR AVS SNAPSHOT
[facility] SPECIALTY [facility] [place] [id number] [phone]               Thank you for choosing us for your health care visit with Gita Owens MD.  We are glad to serve you and happy to provide you with this summary of y If you have questions, you can call [phone] to talk to our [facility] Staff. Remember, [employer] is NOT to be used for urgent needs. For medical emergencies, dial 911.            Visit [facility] online at  [email or web address]

## (undated) NOTE — LETTER
[address], [place], [place]       INFORMED REFUSAL FOR TREATMENT / PROCEDURE / TESTING      I have been advised by Dr. Corazon Connor. [place] that it is necessary for me to undergo the following treatment, procedure or testing.

## (undated) NOTE — LETTER
[address], [place]  Authorization for Invasive Procedures  1.  I hereby authorize Dr. Nicholas Peters  , my physician and whomever may be designated as the doctor's assistant, to perform the following operation and/or procedure:  Colonosco allergic reactions, hemolytic reactions, transmission of disease such as hepatitis, AIDS, cytomegalovirus (CMV), and flluid overload.  In the event that I wish to have autologous transfusions of my own blood, or a directed donor transfusion, I will discuss Patient:  ________________________________________________ Date: _________Time: _________    Responsible person in case of minor or unconscious: _____________________________Relationship: ____________     Witness Signature: ________________________________